# Patient Record
Sex: FEMALE | Race: WHITE
[De-identification: names, ages, dates, MRNs, and addresses within clinical notes are randomized per-mention and may not be internally consistent; named-entity substitution may affect disease eponyms.]

---

## 2019-07-22 ENCOUNTER — HOSPITAL ENCOUNTER (OUTPATIENT)
Dept: HOSPITAL 95 - MOI US | Age: 59
Discharge: HOME | End: 2019-07-22
Attending: ADVANCED PRACTICE MIDWIFE
Payer: COMMERCIAL

## 2019-07-22 DIAGNOSIS — Z17.1: ICD-10-CM

## 2019-07-22 DIAGNOSIS — F17.210: ICD-10-CM

## 2019-07-22 DIAGNOSIS — C77.3: ICD-10-CM

## 2019-07-22 DIAGNOSIS — C50.811: Primary | ICD-10-CM

## 2019-07-22 PROCEDURE — 07B53ZX EXCISION OF RIGHT AXILLARY LYMPHATIC, PERCUTANEOUS APPROACH, DIAGNOSTIC: ICD-10-PCS | Performed by: RADIOLOGY

## 2019-07-22 PROCEDURE — A4648 IMPLANTABLE TISSUE MARKER: HCPCS

## 2019-07-22 PROCEDURE — G0279 TOMOSYNTHESIS, MAMMO: HCPCS

## 2019-07-22 PROCEDURE — 0HBT3ZX EXCISION OF RIGHT BREAST, PERCUTANEOUS APPROACH, DIAGNOSTIC: ICD-10-PCS | Performed by: RADIOLOGY

## 2019-08-11 ENCOUNTER — HOSPITAL ENCOUNTER (EMERGENCY)
Dept: HOSPITAL 95 - ER | Age: 59
Discharge: HOME | End: 2019-08-11
Payer: COMMERCIAL

## 2019-08-11 VITALS — HEIGHT: 62 IN | WEIGHT: 155.01 LBS | BODY MASS INDEX: 28.52 KG/M2

## 2019-08-11 DIAGNOSIS — Z88.5: ICD-10-CM

## 2019-08-11 DIAGNOSIS — Z79.899: ICD-10-CM

## 2019-08-11 DIAGNOSIS — R42: ICD-10-CM

## 2019-08-11 DIAGNOSIS — R51: ICD-10-CM

## 2019-08-11 DIAGNOSIS — I25.10: ICD-10-CM

## 2019-08-11 DIAGNOSIS — F41.9: Primary | ICD-10-CM

## 2019-08-11 DIAGNOSIS — R07.9: ICD-10-CM

## 2019-08-11 DIAGNOSIS — F17.200: ICD-10-CM

## 2019-08-11 DIAGNOSIS — Z88.8: ICD-10-CM

## 2019-08-11 DIAGNOSIS — Z79.82: ICD-10-CM

## 2019-08-11 DIAGNOSIS — R06.00: ICD-10-CM

## 2019-08-11 LAB
ALBUMIN SERPL BCP-MCNC: 3.7 G/DL (ref 3.4–5)
ALBUMIN/GLOB SERPL: 0.9 {RATIO} (ref 0.8–1.8)
ALT SERPL W P-5'-P-CCNC: 23 U/L (ref 12–78)
ANION GAP SERPL CALCULATED.4IONS-SCNC: 5 MMOL/L (ref 6–16)
AST SERPL W P-5'-P-CCNC: 29 U/L (ref 12–37)
BASOPHILS # BLD AUTO: 0.05 K/MM3 (ref 0–0.23)
BASOPHILS NFR BLD AUTO: 1 % (ref 0–2)
BILIRUB SERPL-MCNC: 0.4 MG/DL (ref 0.1–1)
BUN SERPL-MCNC: 9 MG/DL (ref 8–24)
CALCIUM SERPL-MCNC: 9.7 MG/DL (ref 8.5–10.1)
CHLORIDE SERPL-SCNC: 105 MMOL/L (ref 98–108)
CO2 SERPL-SCNC: 27 MMOL/L (ref 21–32)
CREAT SERPL-MCNC: 0.58 MG/DL (ref 0.4–1)
DEPRECATED RDW RBC AUTO: 48.8 FL (ref 35.1–46.3)
EOSINOPHIL # BLD AUTO: 0.08 K/MM3 (ref 0–0.68)
EOSINOPHIL NFR BLD AUTO: 1 % (ref 0–6)
ERYTHROCYTE [DISTWIDTH] IN BLOOD BY AUTOMATED COUNT: 13 % (ref 11.7–14.2)
GLOBULIN SER CALC-MCNC: 3.9 G/DL (ref 2.2–4)
GLUCOSE SERPL-MCNC: 188 MG/DL (ref 70–99)
HCT VFR BLD AUTO: 39.9 % (ref 33–51)
HGB BLD-MCNC: 13.5 G/DL (ref 11.5–16)
IMM GRANULOCYTES # BLD AUTO: 0.03 K/MM3 (ref 0–0.1)
IMM GRANULOCYTES NFR BLD AUTO: 0 % (ref 0–1)
LYMPHOCYTES # BLD AUTO: 1.57 K/MM3 (ref 0.84–5.2)
LYMPHOCYTES NFR BLD AUTO: 20 % (ref 21–46)
MCHC RBC AUTO-ENTMCNC: 33.8 G/DL (ref 31.5–36.5)
MCV RBC AUTO: 101 FL (ref 80–100)
MONOCYTES # BLD AUTO: 0.4 K/MM3 (ref 0.16–1.47)
MONOCYTES NFR BLD AUTO: 5 % (ref 4–13)
NEUTROPHILS # BLD AUTO: 5.72 K/MM3 (ref 1.96–9.15)
NEUTROPHILS NFR BLD AUTO: 73 % (ref 41–73)
NRBC # BLD AUTO: 0 K/MM3 (ref 0–0.02)
NRBC BLD AUTO-RTO: 0 /100 WBC (ref 0–0.2)
PLATELET # BLD AUTO: 311 K/MM3 (ref 150–400)
POTASSIUM SERPL-SCNC: 4.4 MMOL/L (ref 3.5–5.5)
PROT SERPL-MCNC: 7.6 G/DL (ref 6.4–8.2)
SODIUM SERPL-SCNC: 137 MMOL/L (ref 136–145)

## 2019-08-13 ENCOUNTER — HOSPITAL ENCOUNTER (OUTPATIENT)
Dept: HOSPITAL 95 - ORSCMMR | Age: 59
Discharge: HOME | End: 2019-08-13
Attending: SURGERY
Payer: COMMERCIAL

## 2019-08-13 VITALS — HEIGHT: 61.81 IN | BODY MASS INDEX: 28.84 KG/M2 | WEIGHT: 156.75 LBS

## 2019-08-13 DIAGNOSIS — F17.210: ICD-10-CM

## 2019-08-13 DIAGNOSIS — C50.811: Primary | ICD-10-CM

## 2019-08-13 DIAGNOSIS — K21.9: ICD-10-CM

## 2019-08-13 DIAGNOSIS — R56.9: ICD-10-CM

## 2019-08-13 DIAGNOSIS — Z79.899: ICD-10-CM

## 2019-08-13 DIAGNOSIS — I25.10: ICD-10-CM

## 2019-08-13 DIAGNOSIS — I10: ICD-10-CM

## 2019-08-13 DIAGNOSIS — Z79.82: ICD-10-CM

## 2019-08-13 PROCEDURE — 05H533Z INSERTION OF INFUSION DEVICE INTO RIGHT SUBCLAVIAN VEIN, PERCUTANEOUS APPROACH: ICD-10-PCS | Performed by: SURGERY

## 2019-08-13 PROCEDURE — B5161ZA FLUOROSCOPY OF RIGHT SUBCLAVIAN VEIN USING LOW OSMOLAR CONTRAST, GUIDANCE: ICD-10-PCS | Performed by: SURGERY

## 2019-08-13 PROCEDURE — C1788 PORT, INDWELLING, IMP: HCPCS

## 2019-08-13 NOTE — NUR
Ambulatory in Day Surgery
History, Chart, Medications and Allergies reviewed before start of
procedure.Patient confirms NPO status and agrees with scheduled surgery.
Patient reports completing Chlorhexadine shower X2 prior to admission to
hospital.Surgical site prepped with 2% Chlorhexidine cloth wipe.
unsure

## 2019-08-22 ENCOUNTER — HOSPITAL ENCOUNTER (OUTPATIENT)
Dept: HOSPITAL 95 - CT | Age: 59
Discharge: HOME | End: 2019-08-22
Attending: INTERNAL MEDICINE
Payer: COMMERCIAL

## 2019-08-22 DIAGNOSIS — F17.200: ICD-10-CM

## 2019-08-22 DIAGNOSIS — Z79.899: ICD-10-CM

## 2019-08-22 DIAGNOSIS — C34.12: Primary | ICD-10-CM

## 2019-08-22 DIAGNOSIS — Z17.1: ICD-10-CM

## 2019-08-22 DIAGNOSIS — Z88.5: ICD-10-CM

## 2019-08-22 DIAGNOSIS — Z88.8: ICD-10-CM

## 2019-08-22 DIAGNOSIS — C50.811: ICD-10-CM

## 2019-08-22 PROCEDURE — A9270 NON-COVERED ITEM OR SERVICE: HCPCS

## 2019-08-22 NOTE — NUR
KELLY RN TOOK MESSAGE FOR RN. STATED THAT DR ARREGUIN CLEARED PT CXR AND THAT
SHE CAN GO HOME.
Discharge instructions reviewed with patient. Patient verbalizes understanding.
Copy given to patient to take home. NO QUESTIONS OR CONCERNS WITH D.C PT HAS
ALL PERSONAL BELONGINGS. NO C/O. PT STABLE.
Patient States Post-Procedure ride home has been arranged. PT WALKED OUT.

## 2019-08-22 NOTE — NUR
PT HAS BEEN GIVEN TYLENOL WHICH SHE REPORTS HAS HELPED DECREASE HER PAIN AND
THAT SHE FEELS LIKE SHE CAN TAKE A FULL DEEP BREATH.  PT PROVIDED WITH SOUP,
CRACKERS AND COFFEE.   AT BEDSIDE.  REPORT TO SHIRA MATAMOROS.

## 2019-08-22 NOTE — NUR
TAKING OVER CARE OF PT. RECEIVED REPORT FROM STEPHANY MATAMOROS. PT IS SITTING UP IN BED
AT THIS TIME SPEAKING WITH DR ARREGUIN. PT RECEIVED TYLENOL FROM STEPHANY FOR 7/10
PAIN NOW PAIN IS 4/10. PER STEPHANY PT HAS CLEAR TIGHT LUNGS. NO DIFF BREATHING.
PERSON AT BEDSIDE WITH PT. 1230 PT IS SCHEDULED FOR SECOND CXR BEFORE D/C
HOME.

## 2019-08-22 NOTE — NUR
PT TO STEP VIA [facility]PH FROM RADIOLOGY POST LUNG BIOPSY.  PRESENTS WITH BANDAID
TO LEFT CHEST.  C/O CHEST TIGHNESS, INCREASED WITH MOVEMENT OR COUGH.  PT HAS
HAD CHEST XRAY.  PT IN NO ACUTE DISTRESS, RESP EVEN AND UNLABORED.

## 2019-08-22 NOTE — NUR
PT NOW SITTING ON SIDE OF BED. IMAGING SHOULD BE COMING TO GET PT SOON FOR
SECOND IMAGE. PT PAIN CONTINUES TO DECREASE. LT SIDE OF CHEST BANDAID CDI

## 2020-01-10 ENCOUNTER — HOSPITAL ENCOUNTER (EMERGENCY)
Dept: HOSPITAL 95 - ER | Age: 60
LOS: 1 days | Discharge: HOME | End: 2020-01-11
Payer: COMMERCIAL

## 2020-01-10 VITALS — BODY MASS INDEX: 27.23 KG/M2 | HEIGHT: 62 IN | WEIGHT: 148 LBS

## 2020-01-10 DIAGNOSIS — E86.0: ICD-10-CM

## 2020-01-10 DIAGNOSIS — Z88.5: ICD-10-CM

## 2020-01-10 DIAGNOSIS — R55: Primary | ICD-10-CM
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
## 2020-01-10 DIAGNOSIS — K21.9: ICD-10-CM

## 2020-01-10 DIAGNOSIS — Z79.899: ICD-10-CM

## 2020-01-10 DIAGNOSIS — S39.012A: ICD-10-CM

## 2020-01-10 DIAGNOSIS — Z88.8: ICD-10-CM

## 2020-01-10 DIAGNOSIS — Z85.3: ICD-10-CM

## 2020-01-10 LAB
ALBUMIN SERPL BCP-MCNC: 3.3 G/DL (ref 3.4–5)
ALBUMIN/GLOB SERPL: 1 {RATIO} (ref 0.8–1.8)
ALT SERPL W P-5'-P-CCNC: 22 U/L (ref 12–78)
ANION GAP SERPL CALCULATED.4IONS-SCNC: 8 MMOL/L (ref 6–16)
AST SERPL W P-5'-P-CCNC: 27 U/L (ref 12–37)
BASOPHILS # BLD: 0.04 K/MM3 (ref 0–0.23)
BASOPHILS NFR BLD: 1 % (ref 0–2)
BILIRUB SERPL-MCNC: 0.4 MG/DL (ref 0.1–1)
BUN SERPL-MCNC: 10 MG/DL (ref 8–24)
CALCIUM SERPL-MCNC: 9.1 MG/DL (ref 8.5–10.1)
CHLORIDE SERPL-SCNC: 105 MMOL/L (ref 98–108)
CO2 SERPL-SCNC: 27 MMOL/L (ref 21–32)
CREAT SERPL-MCNC: 0.53 MG/DL (ref 0.4–1)
DEPRECATED RDW RBC AUTO: 62 FL (ref 35.1–46.3)
EOSINOPHIL # BLD: 0 K/MM3 (ref 0–0.68)
EOSINOPHIL NFR BLD: 0 % (ref 0–6)
ERYTHROCYTE [DISTWIDTH] IN BLOOD BY AUTOMATED COUNT: 16.2 % (ref 11.7–14.2)
GLOBULIN SER CALC-MCNC: 3.4 G/DL (ref 2.2–4)
GLUCOSE SERPL-MCNC: 98 MG/DL (ref 70–99)
HCT VFR BLD AUTO: 32.1 % (ref 33–51)
HGB BLD-MCNC: 10.3 G/DL (ref 11.5–16)
LYMPHOCYTES # BLD: 0.34 K/MM3 (ref 0.84–5.2)
LYMPHOCYTES NFR BLD: 8 % (ref 21–46)
MCHC RBC AUTO-ENTMCNC: 32.1 G/DL (ref 31.5–36.5)
MCV RBC AUTO: 105 FL (ref 80–100)
METAMYELOCYTES # BLD MANUAL: 0.13 K/MM3 (ref 0–0)
METAMYELOCYTES NFR BLD MANUAL: 3 % (ref 0–0)
MONOCYTES # BLD: 0.04 K/MM3 (ref 0.16–1.47)
MONOCYTES NFR BLD: 1 % (ref 4–13)
NEUTS BAND NFR BLD MANUAL: 6 % (ref 0–8)
NEUTS SEG # BLD MANUAL: 3.8 K/MM3 (ref 1.96–9.15)
NEUTS SEG NFR BLD MANUAL: 81 % (ref 41–73)
NRBC # BLD AUTO: 0 K/MM3 (ref 0–0.02)
NRBC BLD AUTO-RTO: 0 /100 WBC (ref 0–0.2)
PLATELET # BLD AUTO: 127 K/MM3 (ref 150–400)
POTASSIUM SERPL-SCNC: 3.7 MMOL/L (ref 3.5–5.5)
PROT SERPL-MCNC: 6.7 G/DL (ref 6.4–8.2)
SODIUM SERPL-SCNC: 140 MMOL/L (ref 136–145)
TOTAL CELLS COUNTED BLD: 100
TROPONIN I SERPL-MCNC: <0.015 NG/ML (ref 0–0.04)

## 2022-08-23 ENCOUNTER — HOSPITAL ENCOUNTER (OUTPATIENT)
Dept: HOSPITAL 95 - MHTC | Age: 62
Discharge: HOME | End: 2022-08-23
Attending: RADIOLOGY
Payer: MEDICARE

## 2022-08-23 VITALS — WEIGHT: 145.51 LBS | HEIGHT: 62 IN | BODY MASS INDEX: 26.78 KG/M2

## 2022-08-23 DIAGNOSIS — Z79.899: ICD-10-CM

## 2022-08-23 DIAGNOSIS — Z88.8: ICD-10-CM

## 2022-08-23 DIAGNOSIS — Z88.5: ICD-10-CM

## 2022-08-23 DIAGNOSIS — Z95.5: ICD-10-CM

## 2022-08-23 DIAGNOSIS — I25.10: ICD-10-CM

## 2022-08-23 DIAGNOSIS — Z79.02: ICD-10-CM

## 2022-08-23 DIAGNOSIS — I70.213: Primary | ICD-10-CM

## 2022-08-23 DIAGNOSIS — I10: ICD-10-CM

## 2022-08-23 DIAGNOSIS — E78.5: ICD-10-CM

## 2022-08-23 DIAGNOSIS — F17.210: ICD-10-CM

## 2022-08-23 DIAGNOSIS — Z79.82: ICD-10-CM

## 2022-08-23 DIAGNOSIS — K21.9: ICD-10-CM

## 2022-08-23 PROCEDURE — C1894 INTRO/SHEATH, NON-LASER: HCPCS

## 2022-08-23 PROCEDURE — C1760 CLOSURE DEV, VASC: HCPCS

## 2022-08-23 PROCEDURE — C1725 CATH, TRANSLUMIN NON-LASER: HCPCS

## 2022-08-23 PROCEDURE — C1769 GUIDE WIRE: HCPCS

## 2022-08-23 PROCEDURE — C1876 STENT, NON-COA/NON-COV W/DEL: HCPCS

## 2022-08-23 PROCEDURE — C1887 CATHETER, GUIDING: HCPCS

## 2022-08-23 NOTE — NUR
PT AMBULATES TO RESTROOM AND BACK WITHOUT DIFF. BILATERAL FEMORAL SITES
REMAINS CLEAR. NO BLEEDING OR HEMATOMA NOTED. VSS. NADN. PT IV DC'D. CATH
INTACT. PRESSURE DSG APPLIED. PT DC TO HOME VIA S/O BY NEETA.